# Patient Record
Sex: MALE
[De-identification: names, ages, dates, MRNs, and addresses within clinical notes are randomized per-mention and may not be internally consistent; named-entity substitution may affect disease eponyms.]

---

## 2023-01-09 ENCOUNTER — DASHBOARD ENCOUNTERS (OUTPATIENT)
Age: 43
End: 2023-01-09

## 2023-01-09 ENCOUNTER — OFFICE VISIT (OUTPATIENT)
Dept: URBAN - METROPOLITAN AREA CLINIC 54 | Facility: CLINIC | Age: 43
End: 2023-01-09
Payer: COMMERCIAL

## 2023-01-09 ENCOUNTER — WEB ENCOUNTER (OUTPATIENT)
Dept: URBAN - METROPOLITAN AREA CLINIC 54 | Facility: CLINIC | Age: 43
End: 2023-01-09

## 2023-01-09 VITALS
TEMPERATURE: 98.2 F | SYSTOLIC BLOOD PRESSURE: 110 MMHG | DIASTOLIC BLOOD PRESSURE: 85 MMHG | BODY MASS INDEX: 29.66 KG/M2 | WEIGHT: 219 LBS | HEART RATE: 60 BPM | HEIGHT: 72 IN

## 2023-01-09 DIAGNOSIS — R19.4 CHANGE IN BOWEL HABIT: ICD-10-CM

## 2023-01-09 DIAGNOSIS — R63.0 ANOREXIA: ICD-10-CM

## 2023-01-09 PROCEDURE — 99203 OFFICE O/P NEW LOW 30 MIN: CPT | Performed by: INTERNAL MEDICINE

## 2023-01-09 RX ORDER — SODIUM PICOSULFATE, MAGNESIUM OXIDE, AND ANHYDROUS CITRIC ACID 10; 3.5; 12 MG/160ML; G/160ML; G/160ML
160 ML LIQUID ORAL DAILY
Qty: 1 BOX | Refills: 0 | OUTPATIENT
Start: 2023-01-09 | End: 2023-01-10

## 2023-01-09 RX ORDER — LEVETIRACETAM 500 MG/1
2 TABLETS ON THE TONGUE AND ALLOW TO DISSOLVE TABLET, FILM COATED ORAL
Status: ACTIVE | COMMUNITY

## 2023-01-09 RX ORDER — LAMOTRIGINE 150 MG/1
1 TABLET TABLET ORAL ONCE A DAY
Status: ACTIVE | COMMUNITY

## 2023-01-09 NOTE — HPI-TODAY'S VISIT:
Patient reports that he has never been regular but has had increased constipation in the past year.  Had a seizure in 2019 and was placed on Keppra. Pt reports that he was 3 x/week.   Pt reports that since the Keppra he has 1x/week, 30% of time natural bowel movement.  Pt reports that he will use enemas.  Pt reports that he has the fleets enema and will get the stool.  Pt reports no mucous or blood in stool.    Fhx neg for crc and polyps.   Pt will get hard abdomen when diet  Pt reports that he tries to eat as healthy as possible and does a fiber meal in morning. has good amount in protein. 1 gallon

## 2023-01-12 PROBLEM — 79890006: Status: ACTIVE | Noted: 2023-01-09

## 2023-01-24 ENCOUNTER — OFFICE VISIT (OUTPATIENT)
Dept: URBAN - METROPOLITAN AREA SURGERY CENTER 14 | Facility: SURGERY CENTER | Age: 43
End: 2023-01-24

## 2023-02-24 ENCOUNTER — OFFICE VISIT (OUTPATIENT)
Dept: URBAN - NONMETROPOLITAN AREA CLINIC 4 | Facility: CLINIC | Age: 43
End: 2023-02-24

## 2025-04-21 ENCOUNTER — OFFICE VISIT (OUTPATIENT)
Dept: URBAN - METROPOLITAN AREA CLINIC 9 | Facility: CLINIC | Age: 45
End: 2025-04-21

## 2025-04-21 NOTE — HPI-TODAY'S VISIT:
4/21/2025 44 year old male presents today for abdominal pain   Location Quality Frequency Duration Radiation Severity Exacerbating factors Relieving factors  Assoc with  Denies N/V diarrhea constipation hematochezia melena jaundice unintentional wt loss  Denies dyspepsia htbrn dysphagia odynophagia food impaction CP cough anorexia light headedness  NSAID usage  EtOH   prior colon: